# Patient Record
(demographics unavailable — no encounter records)

---

## 2025-07-08 NOTE — PHYSICAL EXAM
[Healthy Appearing] : healthy appearing [Well Nourished] : well nourished [Interactive] : interactive [Normal Appearance] : normal appearance [Well formed] : well formed [Normally Set] : normally set [Normal S1 and S2] : normal S1 and S2 [Clear to Ausculation Bilaterally] : clear to auscultation bilaterally [Abdomen Soft] : soft [Abdomen Tenderness] : non-tender [] : no hepatosplenomegaly [Normal] : normal  [Murmur] : no murmurs [de-identified] : insertion sites with no lipoatrophy or lipohypertrophy

## 2025-07-08 NOTE — THERAPY
[Today's Date] : [unfilled] [Novolog] : Novolog [Carbohydrate Ratio:                  1 unit for every ___ grams of carbohydrates] : Carbohydrate Ratio: 1 unit for every [unfilled] grams of carbohydrates [BG Target = ____] : BG Target = [unfilled] [Insulin Sensitivity Factor = ____] : Insulin Sensitivity Factor = [unfilled] [Insulin on Board (IOB) Duration = ____ hours] : Insulin on Board (IOB) Duration  = [unfilled] hours [FreeTextEntry6] : Omnipod 5 and Dexcom G6

## 2025-07-08 NOTE — HISTORY OF PRESENT ILLNESS
[Other: ___] :  blood sugar levels are tested [unfilled] times per day [2-3] : the pump insertion site is changed every 2 - 3 days [Arms] : arms [Abdomen] : abdomen [Buttocks] : buttocks [_____ times per week] : mild symptoms occuring [unfilled] time(s) per week [Glucagon at Home] : has glucagon at home [Regular Periods] : regular periods [Previous Hypoglycemic Seizure] : has no history of hypoglycemic seizure [FreeTextEntry2] : Tiera is a 17-year 0-month girl with T1DM presenting for followup. . She manages her diabetes with the Omnipod 5 and Dexcom G6. She was last seen in September 2024 when hemoglobin A1c was noted to be elevated to 9.5%.  Since her last visit, TIERA has been well with no recent illness or hospitalization. For the past week she has been off of her pump and was using basaglar 25u daily and short acting insulin for carb coverage and corrections.  G[place]o Review- unavailable (not connected to Omnipod) Dexcom Review from June 25 July 8, 2025  GMI 7.8% Hemoglobin A1c point-of-care today 7.9% 14 % very high 34% high 52% in target 0% low 0% very low average 188 mg/dl +/- 57  On review of systems, Rosie feels well.  Mom notes that she is trying to lose weight.  Mom continues to note that she is constantly reminding her to bolus and that she often still forgets and boluses after her meals.  They feel if she would bolus before her meals her blood sugars would be much tighter.  Last ED labs taken in September 2024 with celiac panel and thyroid panel noted to be within normal limits

## 2025-07-08 NOTE — CONSULT LETTER
[Dear  ___] : Dear  [unfilled], [Consult Letter:] : I had the pleasure of evaluating your patient, [unfilled]. [Please see my note below.] : Please see my note below. [Consult Closing:] : Thank you very much for allowing me to participate in the care of this patient.  If you have any questions, please do not hesitate to contact me. [Sincerely,] : Sincerely, [FreeTextEntry3] : Maria Teresa Pedersen MD  [facility] Physician [facility]ly [facility] Division of Pediatric Endocrinology P: [phone] F: [phone]